# Patient Record
Sex: MALE | Race: WHITE | URBAN - METROPOLITAN AREA
[De-identification: names, ages, dates, MRNs, and addresses within clinical notes are randomized per-mention and may not be internally consistent; named-entity substitution may affect disease eponyms.]

---

## 2019-05-15 ENCOUNTER — TRANSFERRED RECORDS (OUTPATIENT)
Dept: HEALTH INFORMATION MANAGEMENT | Facility: CLINIC | Age: 44
End: 2019-05-15

## 2019-05-30 ENCOUNTER — OFFICE VISIT (OUTPATIENT)
Dept: OPHTHALMOLOGY | Facility: CLINIC | Age: 44
End: 2019-05-30
Attending: OPHTHALMOLOGY
Payer: COMMERCIAL

## 2019-05-30 DIAGNOSIS — H35.54 BEST VITELLIFORM MACULAR DYSTROPHY: Primary | ICD-10-CM

## 2019-05-30 DIAGNOSIS — Z53.9 ERRONEOUS ENCOUNTER--DISREGARD: ICD-10-CM

## 2019-05-30 DIAGNOSIS — H35.89 MACULAR RPE MOTTLING: ICD-10-CM

## 2019-05-30 PROCEDURE — 92270 ELECTRO-OCULOGRAPHY W/I&R: CPT | Mod: ZF | Performed by: OPHTHALMOLOGY

## 2019-05-30 PROCEDURE — 92134 CPTRZ OPH DX IMG PST SGM RTA: CPT | Mod: ZF | Performed by: OPHTHALMOLOGY

## 2019-05-30 PROCEDURE — G0463 HOSPITAL OUTPT CLINIC VISIT: HCPCS | Mod: 25

## 2019-05-30 PROCEDURE — 92250 FUNDUS PHOTOGRAPHY W/I&R: CPT | Mod: XS | Performed by: OPHTHALMOLOGY

## 2019-05-30 RX ORDER — FEXOFENADINE HCL 180 MG/1
1 TABLET ORAL
COMMUNITY
End: 2019-07-24

## 2019-05-30 ASSESSMENT — EXTERNAL EXAM - RIGHT EYE: OD_EXAM: NORMAL

## 2019-05-30 ASSESSMENT — VISUAL ACUITY
METHOD_MR: DEFERS
OS_CC: 20/40
OD_CC: 20/25
METHOD: SNELLEN - LINEAR
OD_CC+: -1
OS_CC+: -2

## 2019-05-30 ASSESSMENT — TONOMETRY
OD_IOP_MMHG: 13
IOP_METHOD: TONOPEN
OS_IOP_MMHG: 12

## 2019-05-30 ASSESSMENT — CONF VISUAL FIELD
OD_NORMAL: 1
OS_NORMAL: 1

## 2019-05-30 ASSESSMENT — SLIT LAMP EXAM - LIDS
COMMENTS: NORMAL
COMMENTS: NORMAL

## 2019-05-30 ASSESSMENT — CUP TO DISC RATIO
OD_RATIO: 0.3
OS_RATIO: 0.3

## 2019-05-30 ASSESSMENT — EXTERNAL EXAM - LEFT EYE: OS_EXAM: NORMAL

## 2019-05-30 NOTE — NURSING NOTE
Chief Complaints and History of Present Illnesses   Patient presents with     Retinal Evaluation     Chief Complaint(s) and History of Present Illness(es)     Retinal Evaluation     Laterality: both eyes              Comments     Pt. States that he started noticing blurred/distorted vision BE 2 months ago.  Has had 2 Avastin injections with no improvement.   No pain BE.  Radha Roper COT 2:08 PM May 30, 2019

## 2019-05-30 NOTE — PROGRESS NOTES
I have confirmed the patient's and reviewed Past Medical History, Past Surgical History, Social History, Family History, Problem List, Medication List and agree with Tech note.    CC: consult macular changes both eyes     HPI: consult from diabetic retinopathy. 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  Professor & Chair

## 2019-05-30 NOTE — NURSING NOTE
OPENED IN ERROR.  ERG ENCOUNTER NOT NEEDED.  EOG ORDERED IN MD ENCOUNTER AND WILL BE DOCUMENTED IN MD ENCOUNTER.

## 2019-07-15 ENCOUNTER — TRANSFERRED RECORDS (OUTPATIENT)
Dept: HEALTH INFORMATION MANAGEMENT | Facility: CLINIC | Age: 44
End: 2019-07-15

## 2019-07-15 ENCOUNTER — MEDICAL CORRESPONDENCE (OUTPATIENT)
Dept: HEALTH INFORMATION MANAGEMENT | Facility: CLINIC | Age: 44
End: 2019-07-15

## 2019-07-24 ENCOUNTER — OFFICE VISIT (OUTPATIENT)
Dept: OPHTHALMOLOGY | Facility: CLINIC | Age: 44
End: 2019-07-24
Attending: OPHTHALMOLOGY
Payer: COMMERCIAL

## 2019-07-24 DIAGNOSIS — H35.54 BEST VITELLIFORM MACULAR DYSTROPHY: Primary | ICD-10-CM

## 2019-07-24 PROCEDURE — G0463 HOSPITAL OUTPT CLINIC VISIT: HCPCS | Mod: ZF

## 2019-07-24 RX ORDER — CETIRIZINE HYDROCHLORIDE 10 MG/1
10 TABLET ORAL DAILY
COMMUNITY

## 2019-07-24 ASSESSMENT — CONF VISUAL FIELD
METHOD: COUNTING FINGERS
OS_NORMAL: 1
OD_NORMAL: 1

## 2019-07-24 ASSESSMENT — VISUAL ACUITY
OD_CC+: -2
OS_CC+: -2
OD_CC: 20/20
CORRECTION_TYPE: CONTACTS
OS_CC: 20/40
METHOD: SNELLEN - LINEAR

## 2019-07-24 ASSESSMENT — TONOMETRY
OD_IOP_MMHG: 18
IOP_METHOD: TONOPEN
OS_IOP_MMHG: 16

## 2019-07-24 NOTE — PROGRESS NOTES
I have confirmed the patient's and reviewed Past Medical History, Past Surgical History, Social History, Family History, Problem List, Medication List and agree with Tech note.    CC: consult adult vitelliform dystrophy     HPI: consult from  Dr. 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    Family:  Brother Gilberto and mother not affected per fundus photos brought by patient.    RTC as needed     Valente Hoff MD PhD.  Professor & Chair

## 2019-07-24 NOTE — NURSING NOTE
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      Angella Turner COT 1:33 PM July 24, 2019

## 2019-07-24 NOTE — LETTER
7/24/2019       RE: Lamin Perez  [address]  [facility] [address]     Dear Colleague,    Thank you for referring your patient, Lamin Perez, to the EYE CLINIC at [facility]. Please see a copy of my visit note below.    I have confirmed the patient's and reviewed Past Medical History, Past Surgical History, Social History, Family History, Problem List, Medication List and agree with Tech note.    CC: consult adult vitelliform dystrophy     HPI: consult from  Dr. 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    Family:  Brother Gilberto and mother not affected per fundus photos brought by patient.    RTC as needed     Valente Murphy MD PhD.  Professor & Chair      Again, thank you for allowing me to participate in the care of your patient.      Sincerely,    Valente Murphy MD

## 2020-03-11 ENCOUNTER — HEALTH MAINTENANCE LETTER (OUTPATIENT)
Age: 45
End: 2020-03-11

## 2021-01-03 ENCOUNTER — HEALTH MAINTENANCE LETTER (OUTPATIENT)
Age: 46
End: 2021-01-03

## 2021-04-25 ENCOUNTER — HEALTH MAINTENANCE LETTER (OUTPATIENT)
Age: 46
End: 2021-04-25

## 2021-10-10 ENCOUNTER — HEALTH MAINTENANCE LETTER (OUTPATIENT)
Age: 46
End: 2021-10-10

## 2022-05-21 ENCOUNTER — HEALTH MAINTENANCE LETTER (OUTPATIENT)
Age: 47
End: 2022-05-21

## 2022-09-18 ENCOUNTER — HEALTH MAINTENANCE LETTER (OUTPATIENT)
Age: 47
End: 2022-09-18

## 2023-06-04 ENCOUNTER — HEALTH MAINTENANCE LETTER (OUTPATIENT)
Age: 48
End: 2023-06-04